# Patient Record
Sex: MALE | Employment: FULL TIME | URBAN - METROPOLITAN AREA
[De-identification: names, ages, dates, MRNs, and addresses within clinical notes are randomized per-mention and may not be internally consistent; named-entity substitution may affect disease eponyms.]

---

## 2023-01-09 ENCOUNTER — TELEMEDICINE (OUTPATIENT)
Dept: INFECTIOUS DISEASES | Age: 48
End: 2023-01-09
Payer: COMMERCIAL

## 2023-01-09 DIAGNOSIS — R89.9 ABNORMAL LABORATORY TEST RESULT: ICD-10-CM

## 2023-01-09 DIAGNOSIS — K51.919 ULCERATIVE COLITIS WITH COMPLICATION, UNSPECIFIED LOCATION (HCC): ICD-10-CM

## 2023-01-09 DIAGNOSIS — R76.12 (QFT) QUANTIFERON-TB TEST REACTION WITHOUT ACTIVE TUBERCULOSIS: Primary | ICD-10-CM

## 2023-01-09 PROCEDURE — 99203 OFFICE O/P NEW LOW 30 MIN: CPT | Performed by: INTERNAL MEDICINE

## 2023-01-09 ASSESSMENT — PATIENT HEALTH QUESTIONNAIRE - PHQ9
SUM OF ALL RESPONSES TO PHQ QUESTIONS 1-9: 0
1. LITTLE INTEREST OR PLEASURE IN DOING THINGS: 0
SUM OF ALL RESPONSES TO PHQ QUESTIONS 1-9: 0
SUM OF ALL RESPONSES TO PHQ9 QUESTIONS 1 & 2: 0
2. FEELING DOWN, DEPRESSED OR HOPELESS: 0

## 2023-01-09 NOTE — PROGRESS NOTES
2023    TELEHEALTH EVALUATION -- Audio/Visual (During WDEET-07 public health emergency)      Ariel Cruz (:  1975) has requested an audio/video evaluation and was evaluated through a real time audio-video encounter. The patient ( or guardian if applicable ) is aware that this is a billable service, which includes applicable co-pays. The virtual visit was conducted with the patient's ( or legal guardian's if applicable ) consent. The visit was conducted pursuant to the emergency declaration under the [address] [place] [address], [address] [place] [address] authority and the [employer] Act. Patient identification was verified, and a caregiver was present when appropriate. The patient was located in a state where the provider was licensed to provide care. The patient was located at an establishment in a state where the provider was licensed to provide care. Due to this being a TeleHealth encounter, evaluation of the following organ systems is limited: Vitals/Constitutional/EENT/Resp/CV/GI//MS/Neuro/Skin/Heme-Lymph-Imm.}  Ariel Cruz  1975  male  Medical Record Number: [id number]    Patient informed that I am an Infectious Disease physician and permission obtained from the patient to speak in front of any visitors prior to any discussion for HIPPA purposes. HPI: 52year old male with UC - under the care of Dr Daniel Benitez. He was referred to ID for indeterminate quantiferon. Last CXR was done when he was admitted to the hospital in 2022 and was negative per referral notes. He has no prior TB history. Quantiferon was checked as part of routine workup to start medication for UC. No travel history, no smoking, alcohol, vaping, or illicits. No exposure to TB, no family members have been exposed to TB that he knows of.      Pets: Cat and parakeet  Work: retail  Vaccines: Flu - no, No covid vaccine  Tetanus vaccine - last was >8 years ago.     No night sweats or respiratory symptoms. +Weight loss for months secondary to UC. Subjectively, no new complaints at this time. Pain is controlled right now. Review of Systems: All 14 review of systems were discussed with the patient and are negative other than as stated above    No real past medical history. Mother has a hx of HTN      Physical Exam:  Since we cannot conduct an in-person exam, the following were addressed with the patient to the best of my capability via virtual visit:   No distress. Normal general appearance  Patient does not perceive any new visual deficits  No scleral icterus or nasal congestion  No obvious exudate Otic, OP or NP  No obvious cervical adenopathy. No diaphoresis or flushing in the face  Patient is able to flex and extend the neck with ease. Patient can inhale and exhale without any difficulty and chest seems to be expanding symmetrically. No conversational dyspnea. Patient does not feel any palpitations   Patient's abdomen is not protruberant beyond their normal size. No new swelling of their joints, no rigidity. No calf pain  No observed neurological changes or slurred speech when speaking to the patient, cranial nerves appear intact. No new skin rash or ulcers   Mood and Affect: Mood normal with appropriate behavior in general. Appropriate responses pertaining to the conversation          Labs: I have reviewed all lab results by electronic record, including most recent CBC, metabolic panel, and pertinent abnormalities were addressed from an infectious disease perspective. WBC trends are being monitored. Last TB quantiferon 12/22: 0.05      Radiology:   I have reviewed imaging results per electronic record and most pertinent abnormalities are being addressed from an infectious disease standpoint. Per MR from hospitalization, CXR negative. Assessment:  Indeterminate quantiferon - asymptomatic, no respiratory issues, negative chest XRAY. Symptomatic UC - in need of starting biometrics. Unintentional weight loss - secondary to UC    Plan:  Repeat serum quantiferon once and obtain CXR results from hospitalization in 12/2022  IF the quantiferon is still indeterminate, place TST. If the TST is negative then no need to treat. Patient will be ok to start biometrics. Will do follow up 6 month TST and TST annually after that. IF the TST is positive with indeterminate quantiferon, then will opt to treat for latent TB since the patient is high risk of TB reactivation. Called and left message for Dr Toma Duong- [phone]    Time spent today in combination of reviewing patient's chart, medications, labs, pictures when pertinent, provider communication as necessary, counseling patient, care/coordination not otherwise reported here, and patient face to face virtual visit 45 min.   >50% of that time spent counseling and coordination of patient care  Patient was also appropriately counseled on preventive measures such as vaccinations, the importance of annual exam with their PCP, and the importance of health maintenance by dietary and exercise regimens. All questions were answered from an ID perspective and to the best of my ability. Both the patient and the provider were located within the state in which the provider is licensed to practice. This was a requested consult  Above is my noted response to the concern that prompted the consult. Communication directed toward referring primary.      Kathryn Ward D.O.

## 2023-02-28 ENCOUNTER — TELEPHONE (OUTPATIENT)
Dept: INFECTIOUS DISEASES | Age: 48
End: 2023-02-28

## 2023-02-28 NOTE — TELEPHONE ENCOUNTER
Follow up call to patient. Unable to leave a message. Call to Spouse Keli's M#. Karlene Ayala. Re: Offer F/u appt with ID Physician. Request a return call. Patient was referred to ID in Jan of 2023, V/v completed on 1/9/23. 6-Mo F/u requested. Will assist with an appt to F/u with Dr Minh French if patient returns call.